# Patient Record
Sex: FEMALE | Race: WHITE | NOT HISPANIC OR LATINO
[De-identification: names, ages, dates, MRNs, and addresses within clinical notes are randomized per-mention and may not be internally consistent; named-entity substitution may affect disease eponyms.]

---

## 2020-05-21 ENCOUNTER — APPOINTMENT (OUTPATIENT)
Dept: OPHTHALMOLOGY | Facility: CLINIC | Age: 30
End: 2020-05-21
Payer: COMMERCIAL

## 2020-05-21 ENCOUNTER — NON-APPOINTMENT (OUTPATIENT)
Age: 30
End: 2020-05-21

## 2020-05-21 PROBLEM — Z00.00 ENCOUNTER FOR PREVENTIVE HEALTH EXAMINATION: Status: ACTIVE | Noted: 2020-05-21

## 2020-05-21 PROCEDURE — 99202 OFFICE O/P NEW SF 15 MIN: CPT

## 2020-11-11 ENCOUNTER — TRANSCRIPTION ENCOUNTER (OUTPATIENT)
Age: 30
End: 2020-11-11

## 2023-01-05 ENCOUNTER — NON-APPOINTMENT (OUTPATIENT)
Age: 33
End: 2023-01-05

## 2023-04-12 ENCOUNTER — APPOINTMENT (OUTPATIENT)
Dept: OTOLARYNGOLOGY | Facility: CLINIC | Age: 33
End: 2023-04-12
Payer: COMMERCIAL

## 2023-04-12 VITALS
OXYGEN SATURATION: 100 % | BODY MASS INDEX: 21.6 KG/M2 | HEART RATE: 60 BPM | TEMPERATURE: 98.1 F | DIASTOLIC BLOOD PRESSURE: 67 MMHG | HEIGHT: 60 IN | RESPIRATION RATE: 18 BRPM | SYSTOLIC BLOOD PRESSURE: 114 MMHG | WEIGHT: 110 LBS

## 2023-04-12 PROCEDURE — 99203 OFFICE O/P NEW LOW 30 MIN: CPT

## 2023-04-12 RX ORDER — METHYLPREDNISOLONE 4 MG/1
4 TABLET ORAL
Qty: 1 | Refills: 0 | Status: ACTIVE | COMMUNITY
Start: 2023-04-12 | End: 1900-01-01

## 2023-04-18 NOTE — CONSULT LETTER
[Dear  ___] : Dear  [unfilled], [Consult Letter:] : I had the pleasure of evaluating your patient, [unfilled]. [Please see my note below.] : Please see my note below. [Consult Closing:] : Thank you very much for allowing me to participate in the care of this patient.  If you have any questions, please do not hesitate to contact me. [Sincerely,] : Sincerely, [FreeTextEntry3] : Pankaj Miramontes MD, FACS\par Professor of Otolaryngology, [facility] [employer] at [facility]\par Director, [facility], Department of Otolaryngology, [facility]\par , Head & Neck Service Line, [facility]\par

## 2023-04-18 NOTE — REASON FOR VISIT
[Initial Evaluation] : an initial evaluation for [FreeTextEntry2] : sore throat ? strep infection for 2/3mo

## 2023-04-26 ENCOUNTER — APPOINTMENT (OUTPATIENT)
Dept: OTOLARYNGOLOGY | Facility: CLINIC | Age: 33
End: 2023-04-26

## 2023-05-08 ENCOUNTER — APPOINTMENT (OUTPATIENT)
Dept: OTOLARYNGOLOGY | Facility: CLINIC | Age: 33
End: 2023-05-08
Payer: COMMERCIAL

## 2023-05-08 VITALS
HEIGHT: 60 IN | RESPIRATION RATE: 16 BRPM | HEART RATE: 57 BPM | SYSTOLIC BLOOD PRESSURE: 116 MMHG | OXYGEN SATURATION: 97 % | DIASTOLIC BLOOD PRESSURE: 67 MMHG | WEIGHT: 110 LBS | BODY MASS INDEX: 21.6 KG/M2

## 2023-05-08 DIAGNOSIS — J04.0 ACUTE LARYNGITIS: ICD-10-CM

## 2023-05-08 DIAGNOSIS — K21.9 GASTRO-ESOPHAGEAL REFLUX DISEASE W/OUT ESOPHAGITIS: ICD-10-CM

## 2023-05-08 DIAGNOSIS — J02.9 ACUTE PHARYNGITIS, UNSPECIFIED: ICD-10-CM

## 2023-05-08 PROCEDURE — 99213 OFFICE O/P EST LOW 20 MIN: CPT

## 2023-05-08 NOTE — REVIEW OF SYSTEMS
[Patient Intake Form Reviewed] : Patient intake form was reviewed [As Noted in HPI] : as noted in HPI [Throat Clearing] : throat clearing [Throat Pain] : throat pain [Throat Dryness] : throat dryness [Negative] : Heme/Lymph [Hoarseness] : no hoarseness

## 2023-05-08 NOTE — HISTORY OF PRESENT ILLNESS
[de-identified] : 32 years old female patient with history of Laryngitis. history of   strep infections for every 2 to 3 months.  Patient is present today in the office with improved Laryngitis  and Pharyngitis.  GERD

## 2023-05-08 NOTE — REASON FOR VISIT
[Subsequent Evaluation] : a subsequent evaluation for [FreeTextEntry2] : Laryngitis. history of   strep infections for every 2 to 3 months

## 2023-05-08 NOTE — CONSULT LETTER
[Please see my note below.] : Please see my note below. [Consult Closing:] : Thank you very much for allowing me to participate in the care of this patient.  If you have any questions, please do not hesitate to contact me. [Sincerely,] : Sincerely, [FreeTextEntry3] : Pankaj Miramontes MD, FACS\par Professor of Otolaryngology, [facility] [employer] at [facility]\par Director, [facility], Department of Otolaryngology, [facility]\par , Head & Neck Service Line, [facility]\par